# Patient Record
Sex: MALE | Race: WHITE | NOT HISPANIC OR LATINO | URBAN - METROPOLITAN AREA
[De-identification: names, ages, dates, MRNs, and addresses within clinical notes are randomized per-mention and may not be internally consistent; named-entity substitution may affect disease eponyms.]

---

## 2021-02-19 ENCOUNTER — OFFICE VISIT (OUTPATIENT)
Dept: URBAN - METROPOLITAN AREA CLINIC 76 | Facility: CLINIC | Age: 61
End: 2021-02-19
Payer: MEDICARE

## 2021-02-19 DIAGNOSIS — E11.9 TYPE 2 DIABETES MELLITUS W/O COMPLICATION: Primary | ICD-10-CM

## 2021-02-19 DIAGNOSIS — H25.13 AGE-RELATED NUCLEAR CATARACT, BILATERAL: ICD-10-CM

## 2021-02-19 PROCEDURE — 92004 COMPRE OPH EXAM NEW PT 1/>: CPT | Performed by: OPTOMETRIST

## 2021-02-19 ASSESSMENT — KERATOMETRY
OD: 48.50
OS: 49.50

## 2021-02-19 NOTE — IMPRESSION/PLAN
Impression: Type 2 diabetes mellitus w/o complication: M02.2. Plan: Discussed diagnosis in detail with patient. No treatment is required at this time. Emphasized blood sugar control. Call if [place] worsens. Will continue to observe condition and or symptoms. Recommend yearly exams. Letter sent to PCP.